# Patient Record
Sex: MALE | Race: WHITE | NOT HISPANIC OR LATINO | Employment: UNEMPLOYED | URBAN - METROPOLITAN AREA
[De-identification: names, ages, dates, MRNs, and addresses within clinical notes are randomized per-mention and may not be internally consistent; named-entity substitution may affect disease eponyms.]

---

## 2018-07-05 ENCOUNTER — OFFICE VISIT (OUTPATIENT)
Dept: INTERNAL MEDICINE CLINIC | Facility: CLINIC | Age: 28
End: 2018-07-05
Payer: COMMERCIAL

## 2018-07-05 VITALS
RESPIRATION RATE: 16 BRPM | DIASTOLIC BLOOD PRESSURE: 72 MMHG | OXYGEN SATURATION: 97 % | SYSTOLIC BLOOD PRESSURE: 124 MMHG | WEIGHT: 160 LBS | TEMPERATURE: 98.6 F | HEIGHT: 68 IN | BODY MASS INDEX: 24.25 KG/M2 | HEART RATE: 107 BPM

## 2018-07-05 DIAGNOSIS — F32.A ANXIETY AND DEPRESSION: ICD-10-CM

## 2018-07-05 DIAGNOSIS — Z72.0 NICOTINE ABUSE: ICD-10-CM

## 2018-07-05 DIAGNOSIS — Z20.5 EXPOSURE TO HEPATITIS C: ICD-10-CM

## 2018-07-05 DIAGNOSIS — F41.9 ANXIETY AND DEPRESSION: ICD-10-CM

## 2018-07-05 DIAGNOSIS — Z00.00 ROUTINE GENERAL MEDICAL EXAMINATION AT A HEALTH CARE FACILITY: ICD-10-CM

## 2018-07-05 DIAGNOSIS — Z13.6 SCREENING FOR HEART DISEASE: ICD-10-CM

## 2018-07-05 DIAGNOSIS — B07.9 WARTS OF FOOT: ICD-10-CM

## 2018-07-05 DIAGNOSIS — Z00.00 ENCOUNTER FOR PREVENTIVE CARE: ICD-10-CM

## 2018-07-05 DIAGNOSIS — R53.83 FATIGUE, UNSPECIFIED TYPE: Primary | ICD-10-CM

## 2018-07-05 DIAGNOSIS — Z13.1 SCREENING FOR DIABETES MELLITUS: ICD-10-CM

## 2018-07-05 PROCEDURE — 4004F PT TOBACCO SCREEN RCVD TLK: CPT | Performed by: NURSE PRACTITIONER

## 2018-07-05 PROCEDURE — 99204 OFFICE O/P NEW MOD 45 MIN: CPT | Performed by: NURSE PRACTITIONER

## 2018-07-05 RX ORDER — ESCITALOPRAM OXALATE 10 MG/1
5 TABLET ORAL DAILY
Qty: 30 TABLET | Refills: 0 | Status: SHIPPED | OUTPATIENT
Start: 2018-07-05

## 2018-07-05 RX ORDER — NICOTINE 21 MG/24HR
1 PATCH, TRANSDERMAL 24 HOURS TRANSDERMAL EVERY 24 HOURS
Qty: 42 PATCH | Refills: 0 | Status: SHIPPED | OUTPATIENT
Start: 2018-07-05 | End: 2018-08-16

## 2018-07-05 NOTE — ASSESSMENT & PLAN NOTE
Pt has complex mental health history and a strong family history of mental illness and substance abuse  As patient is looking to begin treatment now, we discussed treatment options and I prescribed escitalopram 10 mg and referred him to behavioral health  I explained, however, that ultimately, with his complex history he may likely require psychiatric management of his mediation as once medication is not likely to be sufficient  Pt is in agreement and he will schedule an appointment with Jose Rashid

## 2018-07-05 NOTE — ASSESSMENT & PLAN NOTE
Family history of thyroid disease  TSH level ordered  CBC also ordered to evaluate for anemia  Pt does not believe he snores

## 2018-07-05 NOTE — LETTER
July 5, 2018     Patient: Abdirahman Martinez   YOB: 1990   Date of Visit: 7/5/2018       To Whom it May Concern:    Jovanna Landon is under my professional care  He was seen in my office on 7/5/2018  He may return to work on 7/9  If you have any questions or concerns, please don't hesitate to call           Sincerely,          MIRIAM Chi        CC: Wm Huang

## 2018-07-05 NOTE — ASSESSMENT & PLAN NOTE
Prescribed nicotine patch 21 mg x 6 weeks  Explained how to use the product; he should remove patch before bed and then apply new one in the morning

## 2018-07-05 NOTE — PROGRESS NOTES
Assessment/Plan:    Warts of foot  Pt is afraid to try over the counter treatments because he does not want to make anything worse and he is worried that he will lose his foot  I did try to reassure him that the home treatments are low risk and he can try topical treatments of freezing treatments  He states he is going to try an aloe vera treatment that he read about  I did also give him a referral to podiatry because if he feels he would like a professional opinion  He would also like to discuss need for arch support with podiatrist     Anxiety and depression  Pt has complex mental health history and a strong family history of mental illness and substance abuse  As patient is looking to begin treatment now, we discussed treatment options and I prescribed escitalopram 10 mg and referred him to behavioral health  I explained, however, that ultimately, with his complex history he may likely require psychiatric management of his mediation as once medication is not likely to be sufficient  Pt is in agreement and he will schedule an appointment with Magaly Cormier  Exposure to hepatitis C  Exposure to known hep c infection of patients wife  Testing ordered  Fatigue  Family history of thyroid disease  TSH level ordered  CBC also ordered to evaluate for anemia  Pt does not believe he snores  Nicotine abuse  Prescribed nicotine patch 21 mg x 6 weeks  Explained how to use the product; he should remove patch before bed and then apply new one in the morning  Diagnoses and all orders for this visit:    Fatigue, unspecified type  -     TSH, 3rd generation with T4 reflex; Future  -     CBC and differential; Future    Screening for heart disease  -     Lipid panel; Future    Screening for diabetes mellitus  -     Comprehensive metabolic panel;  Future    Nicotine abuse  -     nicotine (NICODERM CQ) 21 mg/24 hr TD 24 hr patch; Place 1 patch on the skin every 24 hours for 42 days    Anxiety and depression  -     Ambulatory referral to [facility]; Future  -     escitalopram (LEXAPRO) 10 mg tablet; Take 0 5 tablets (5 mg total) by mouth daily    Warts of foot  -     Ambulatory referral to Podiatry; Future    Encounter for preventive care  -     Comprehensive metabolic panel; Future  -     CBC and differential; Future  -     Lipid panel; Future    Exposure to hepatitis C  -     Hepatitis C antibody; Future    Routine general medical examination at a health care facility          Subjective:      Patient ID: Mendel Forest is a 29 y o  male  Pt is a 29y o  year old male who is here today as a new pt to establish care  PMH includes bipolar disorder, PTSD, anxiety/depression, substance abuse, nicotine abuse  We reviewed age based screening recommendations, pt is in need of dental care and vision testing  He has multiple risk factors for heart disease including smoking, poor nutrition, lack of exercise so I did recommend screening for heart disease as well as diabetes  Pt also complains of fatigue and unrestful sleep and he has a family history of thyroid disease so I recommended thyroid testing as well  Pt's wife has hepatitis C history and they have been having unprotected sex for years to also recommended testing for hepatitis C  Pt is interested in smoking cessation and we discussed treatment options  He had very poor tolerance of chantix with homicidal ideation and bad dreams  He was successful with nicotine patches in the past and is interested to try this again  Pt complains of a painful lesion on his left foot x 3 weeks  It is non-draining with no erythema, numbness, tingling, or weakness  He has not tried any topical treatments  He also has an irregular wear pattern on his shoes where th inner sole wears more than the outer sole and he has associated knee pain  Pt is requesting evaluation and treatment for his mental health conditions    He has tried treatment with multiple medications as a teen but took himself off of medication several years ago  The following portions of the patient's history were reviewed and updated as appropriate: allergies, current medications, past family history, past medical history, past social history, past surgical history and problem list     Review of Systems   Constitutional: Positive for fatigue  Negative for activity change, appetite change, chills, fever and unexpected weight change  HENT: Negative for hearing loss  Eyes: Positive for visual disturbance  Respiratory: Negative for cough, chest tightness and shortness of breath  Cardiovascular: Negative for chest pain, palpitations and leg swelling  Gastrointestinal: Positive for constipation  Negative for diarrhea, nausea and vomiting  Genitourinary: Negative for dysuria and frequency  Musculoskeletal: Positive for arthralgias (r knee)  Negative for joint swelling and myalgias  Skin: Positive for wound  Neurological: Negative for dizziness, weakness, numbness and headaches  Psychiatric/Behavioral: Positive for dysphoric mood  Negative for behavioral problems, self-injury, sleep disturbance and suicidal ideas  The patient is nervous/anxious  Objective:      /72 (BP Location: Right arm, Patient Position: Sitting, Cuff Size: Standard)   Pulse (!) 107   Temp 98 6 °F (37 °C)   Resp 16   Ht 5' 8" (1 727 m)   Wt 72 6 kg (160 lb)   SpO2 97%   BMI 24 33 kg/m²          Physical Exam   Constitutional: He is oriented to person, place, and time  He appears well-developed and well-nourished  He is cooperative  HENT:   Right Ear: Hearing, tympanic membrane, external ear and ear canal normal    Left Ear: Hearing, tympanic membrane, external ear and ear canal normal    Nose: Mucosal edema present  Mouth/Throat: Oropharynx is clear and moist  Mucous membranes are dry  Eyes: Conjunctivae and lids are normal  Pupils are equal, round, and reactive to light  Neck: Normal range of motion and full passive range of motion without pain  Neck supple  No JVD present  Carotid bruit is not present  No thyromegaly present  Cardiovascular: Normal rate, regular rhythm, S1 normal, S2 normal, normal heart sounds and intact distal pulses  No murmur heard  Pulmonary/Chest: Effort normal and breath sounds normal    Abdominal: Soft  Normal appearance and bowel sounds are normal  There is no hepatosplenomegaly  There is tenderness in the right upper quadrant  Musculoskeletal: Normal range of motion  He exhibits no edema  Lymphadenopathy:     He has no cervical adenopathy  Neurological: He is alert and oriented to person, place, and time  He has normal strength and normal reflexes  No sensory deficit  Skin: Skin is warm, dry and intact  Psychiatric: He has a normal mood and affect  His speech is normal and behavior is normal  Judgment and thought content normal  Cognition and memory are normal    Vitals reviewed

## 2018-07-05 NOTE — ASSESSMENT & PLAN NOTE
Pt is afraid to try over the counter treatments because he does not want to make anything worse and he is worried that he will lose his foot  I did try to reassure him that the home treatments are low risk and he can try topical treatments of freezing treatments  He states he is going to try an aloe vera treatment that he read about  I did also give him a referral to podiatry because if he feels he would like a professional opinion    He would also like to discuss need for arch support with podiatrist

## 2018-07-27 ENCOUNTER — TELEPHONE (OUTPATIENT)
Dept: INTERNAL MEDICINE CLINIC | Facility: CLINIC | Age: 28
End: 2018-07-27

## 2018-07-27 NOTE — TELEPHONE ENCOUNTER
Pt wife asked for a call back regarding Pt lexapro  She has not seen any change regarding his depression  She can be reached at [phone]  He is working 2 separate shifts today so he is unable to schedule an appt, unless it's a walk-in

## 2018-08-30 ENCOUNTER — OFFICE VISIT (OUTPATIENT)
Dept: BEHAVIORAL/MENTAL HEALTH CLINIC | Facility: CLINIC | Age: 28
End: 2018-08-30
Payer: COMMERCIAL

## 2018-08-30 DIAGNOSIS — F32.A ANXIETY AND DEPRESSION: Primary | ICD-10-CM

## 2018-08-30 DIAGNOSIS — F41.9 ANXIETY AND DEPRESSION: Primary | ICD-10-CM

## 2018-08-30 PROCEDURE — 90834 PSYTX W PT 45 MINUTES: CPT | Performed by: SOCIAL WORKER
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
## 2018-08-31 ENCOUNTER — TELEPHONE (OUTPATIENT)
Dept: PSYCHIATRY | Facility: CLINIC | Age: 28
End: 2018-08-31

## 2018-08-31 NOTE — TELEPHONE ENCOUNTER
----- Message from Natanael Ramos sent at 8/30/2018  5:39 PM EDT -----  Would like him to see Ankit Alvarez

## 2018-09-06 ENCOUNTER — TELEPHONE (OUTPATIENT)
Dept: BEHAVIORAL/MENTAL HEALTH CLINIC | Facility: CLINIC | Age: 28
End: 2018-09-06
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
## 2018-09-27 ENCOUNTER — OFFICE VISIT (OUTPATIENT)
Dept: BEHAVIORAL/MENTAL HEALTH CLINIC | Facility: CLINIC | Age: 28
End: 2018-09-27
Payer: COMMERCIAL

## 2018-09-27 DIAGNOSIS — F32.A ANXIETY AND DEPRESSION: Primary | ICD-10-CM

## 2018-09-27 DIAGNOSIS — F41.9 ANXIETY AND DEPRESSION: Primary | ICD-10-CM

## 2018-09-27 PROCEDURE — 90834 PSYTX W PT 45 MINUTES: CPT | Performed by: SOCIAL WORKER

## 2018-09-27 NOTE — PATIENT INSTRUCTIONS
Processed his feelings of sadness regarding mother's passing as well his anger and frustration from family's behavior  Validated these feelings and support provided  Reviewed appropriate coping strategies for anxiety and depression on consistent basis  Focused on utilizing appropriate outlets and expression of emotions

## 2018-09-27 NOTE — PSYCH
Assessment/Plan: Manage anxiety and depression     There are no diagnoses linked to this encounter  Subjective: Has been struggling with anxiety and anger issues due to stressors  Stressors are valid  Patient ID: Brayan Joy is a 29 y o  male  Continues to struggle with anxiety and depression  Working over 60 hours a week  Review of Systems   Psychiatric/Behavioral: Positive for dysphoric mood  The patient is nervous/anxious  Objective: Presents as verbal, cooperative and well oriented  Tearful discussing loss of mother and his housing and financial issues family has made worse for him  Denies any SI or HI  Has reduced his alcohol intake after beginning to drink more in response to stress  Physical Exam   Psychiatric: His speech is normal and behavior is normal  Judgment and thought content normal  His mood appears anxious  Cognition and memory are normal  He exhibits a depressed mood  No SI or HI

## 2018-11-01 ENCOUNTER — OFFICE VISIT (OUTPATIENT)
Dept: BEHAVIORAL/MENTAL HEALTH CLINIC | Facility: CLINIC | Age: 28
End: 2018-11-01
Payer: COMMERCIAL

## 2018-11-01 DIAGNOSIS — F41.9 ANXIETY: ICD-10-CM

## 2018-11-01 DIAGNOSIS — F43.12 CHRONIC POST-TRAUMATIC STRESS DISORDER (PTSD): Primary | ICD-10-CM

## 2018-11-01 DIAGNOSIS — F33.1 DEPRESSION, MAJOR, RECURRENT, MODERATE (HCC): ICD-10-CM

## 2018-11-01 PROCEDURE — 90791 PSYCH DIAGNOSTIC EVALUATION: CPT | Performed by: SOCIAL WORKER
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
## 2018-11-01 NOTE — PSYCH
Carmen Ward Michelle  1990       Date of Initial Treatment Plan: 11/1/18   Date of Current Treatment Plan: 11/01/18       Treatment Plan Number 1     Strengths/Personal Resources for Self Care: Father, hard worker,     Diagnosis:   1  Chronic post-traumatic stress disorder (PTSD)     2  Anxiety     3  Depression, major, recurrent, moderate (HCC)         Area of Needs: Historic trauma, financial struggles, recent death of mother      Long Term Goal 1:   I have gotten over my demons from my past  Target Date: 2/25/19  Completion Date:     Short Term Objectives for Goal 1:   1  Process historic trauma  2  Identify/utilize skills  3  Currently living in house I grew up in  4  Mom's death     Long Term Goal 2:    Jacquie and I communicate more effectively  Target Date: 2/25/19  Completion Date:     Short Term Objectives for Goal 2:   1  Stopping to think before I comment when angry or hurt       2  Trust       3  Financial issues      GOAL 1: Modality: Individual therapy 2x/month   Completion Date:                  Persons responsible for goals:  Flash Weller    GOAL 2: Modality:  Individual therapy 2x/month   Completion Date:                  Persons responsible for goals:  Flash Villarreal [facility] 72 : Diagnosis and Treatment Plan explained to Horacioharmony Libertyshala relates understanding diagnosis and is agreeable to Treatment Plan         Client Comments : Please share your thoughts, feelings, need and/or experiences regarding your treatment plan:       __________________________________________________________________    __________________________________________________________________    __________________________________________________________________    __________________________________________________________________    _______________________________________                Patient signature, Date Time: __________________________________________             Physician cosigner signature, Date, Time: ________________________________

## 2018-11-05 ENCOUNTER — TELEPHONE (OUTPATIENT)
Dept: INTERNAL MEDICINE CLINIC | Facility: CLINIC | Age: 28
End: 2018-11-05

## 2018-11-05 NOTE — TELEPHONE ENCOUNTER
----- Message from Diamond Scott sent at 11/2/2018  8:22 AM EDT -----  Can somebody please schedule this patient with me so that I can discuss discuss his medication changes  Thanks :)  ----- Message -----  From: Domenica Martinez  Sent: 11/1/2018  11:01 AM  To: MIRIAM Sánchez, Frank Reyna and Lynn Speaker in the office today  Very nice man but struggling greatly with PTSD symptoms and depression, anxiety  His PHQ is high - no SI though  GAD7 also high  He disclosed he has been taking his friends Xanax 1mg and this has been helpful as without it he can't stop his thoughts/memories or sleep  Can we give him is own script? He also stated Lexapro is not effective  He tried doubling his dose and was on it faithfully for a decent time period without effectiveness  We are not scheduling Psychiatry due to back log Es Every you know this)  Can we try Zoloft or Prozac? Lincoln Franklin can you get him in for an appointment to see Fabian Coyle? Thanks!  Fabian Coyle

## 2018-11-05 NOTE — TELEPHONE ENCOUNTER
Called patient to schedule appointment no answer left message asking to give the office a call back

## 2019-01-15 ENCOUNTER — DOCUMENTATION (OUTPATIENT)
Dept: BEHAVIORAL/MENTAL HEALTH CLINIC | Facility: CLINIC | Age: 29
End: 2019-01-15

## 2019-01-15 DIAGNOSIS — F43.12 CHRONIC POST-TRAUMATIC STRESS DISORDER (PTSD): Primary | ICD-10-CM

## 2019-01-15 DIAGNOSIS — F33.1 DEPRESSION, MAJOR, RECURRENT, MODERATE (HCC): ICD-10-CM

## 2019-01-15 DIAGNOSIS — F41.9 ANXIETY: ICD-10-CM

## 2019-01-15 NOTE — PROGRESS NOTES
Assessment/Plan:      Diagnoses and all orders for this visit:    Chronic post-traumatic stress disorder (PTSD)    Depression, major, recurrent, moderate (HCC)    Anxiety          Subjective:     Patient ID: Bronson Brooks is a 29 y o  male  Outpatient Discharge Summary:   Admission Date: 11/1/18  Maldonado Ta was referred by [facility]  Discharge Date: 1/17/19    Discharge Diagnosis:    1  Chronic post-traumatic stress disorder (PTSD)     2  Depression, major, recurrent, moderate ([address] )     3  Anxiety         Treating Physician: MIIRAM Guevara  Treatment Complications: None  Presenting Problem: Mom passed away end of July this year  We didn't have a good history - but we just started rebuilding relationship and she passed  Death was a shock   4 years- together 8  Jacquie has been very supportive  Have 2 children, Judit 4, Benito 3  New baby on the way (boy) due 11/30  Financial issues are a struggle  Since mom's passing - historic childhood trauma has resurfaced  Legalized at  - went to [facility] as an adult - conspiricry to commit robbert - lived at a half [employer]  "[employer] [facility] the [facility]'  Maldonado Ta did not attend any additional sessions after initial eval     Course of treatment includes:    individual therapy   Treatment Progress: Unable to assess due to only one appointment  Criteria for Discharge: Maldonado Ta did not attend any additional sessions after initial eval   Aftercare recommendations include Encouraged to follow Dr Hardy Kong and return to therapy when able to commit to scheduled appointments    Discharge Medications include:  Current Outpatient Prescriptions:     escitalopram (LEXAPRO) 10 mg tablet, Take 0 5 tablets (5 mg total) by mouth daily, Disp: 30 tablet, Rfl: 0    nicotine (NICODERM CQ) 21 mg/24 hr TD 24 hr patch, Place 1 patch on the skin every 24 hours for 42 days, Disp: 42 patch, Rfl: 0    Prognosis: Unable to assess due to only one appointment

## 2022-03-13 ENCOUNTER — HOSPITAL ENCOUNTER (EMERGENCY)
Facility: HOSPITAL | Age: 32
Discharge: HOME | End: 2022-03-13
Attending: EMERGENCY MEDICINE

## 2022-03-13 VITALS
OXYGEN SATURATION: 99 % | WEIGHT: 160 LBS | BODY MASS INDEX: 24.33 KG/M2 | HEART RATE: 121 BPM | TEMPERATURE: 98.9 F | SYSTOLIC BLOOD PRESSURE: 135 MMHG | RESPIRATION RATE: 18 BRPM | DIASTOLIC BLOOD PRESSURE: 88 MMHG

## 2022-03-13 DIAGNOSIS — S61.512A WRIST LACERATION, LEFT, INITIAL ENCOUNTER: Primary | ICD-10-CM

## 2022-03-13 PROCEDURE — 99283 EMERGENCY DEPT VISIT LOW MDM: CPT

## 2022-03-13 PROCEDURE — 12002 RPR S/N/AX/GEN/TRNK2.6-7.5CM: CPT | Performed by: PHYSICIAN ASSISTANT

## 2022-03-13 PROCEDURE — 99282 EMERGENCY DEPT VISIT SF MDM: CPT | Performed by: PHYSICIAN ASSISTANT

## 2022-03-13 RX ORDER — LIDOCAINE HYDROCHLORIDE 20 MG/ML
10 INJECTION, SOLUTION EPIDURAL; INFILTRATION; INTRACAUDAL; PERINEURAL ONCE
Status: COMPLETED | OUTPATIENT
Start: 2022-03-13 | End: 2022-03-13

## 2022-03-13 RX ADMIN — LIDOCAINE HYDROCHLORIDE 10 ML: 20 INJECTION, SOLUTION EPIDURAL; INFILTRATION; INTRACAUDAL; PERINEURAL at 05:30

## 2022-03-13 NOTE — ED PROVIDER NOTES
History  Chief Complaint   Patient presents with    Hand Laceration     Hand laceration and then after needs medical clearance for incarceration  80-year-old right-handed male without significant past medical history presents in police custody for evaluation of a left wrist laceration prior to incarceration  Patient will not tell me how he cut his wrist   Patient states that his tetanus shot is up-to-date and was then in the last 5 years  Denies any other injury sustained  Denies SI or HI  Denies any other complaints       History provided by:  Patient   used: No        Prior to Admission Medications   Prescriptions Last Dose Informant Patient Reported? Taking?   escitalopram (LEXAPRO) 10 mg tablet   No No   Sig: Take 0 5 tablets (5 mg total) by mouth daily   nicotine (NICODERM CQ) 21 mg/24 hr TD 24 hr patch   No No   Sig: Place 1 patch on the skin every 24 hours for 42 days      Facility-Administered Medications: None       Past Medical History:   Diagnosis Date    Anxiety     Dental disorder 04/17/2017    Last assessed    Depression     GERD (gastroesophageal reflux disease)     intermittent    Substance abuse ([facility] [address] )     Visual impairment     distance vision blurring       Past Surgical History:   Procedure Laterality Date    DENTAL SURGERY         Family History   Problem Relation Age of Onset    Diabetes Mother     Thyroid disease Mother     Hypertension Mother     COPD Mother     Asthma Mother     Arthritis Mother     Depression Mother     Mental illness Mother     Substance Abuse Mother     Diabetes Maternal Aunt     Breast cancer Maternal Aunt     Hypertension Maternal Uncle     Diabetes Family     Thyroid disease Sister     Diabetes Maternal Grandmother     Coronary artery disease Maternal Grandmother     Arthritis Maternal Grandmother      I have reviewed and agree with the history as documented      E-Cigarette/Vaping     E-Cigarette/Vaping Substances Social History     Tobacco Use    Smoking status: Current Every Day Smoker     Packs/day: 1 00     Years: 15 00     Pack years: 15 00    Smokeless tobacco: Never Used   Substance Use Topics    Alcohol use: Yes     Alcohol/week: 21 0 standard drinks     Types: 21 Cans of beer per week    Drug use: Yes     Types: Marijuana, Oxycodone     Comment: THC oil daily, occasional percocet for knee pain       Review of Systems   Constitutional: Negative  Negative for chills and fatigue  HENT: Negative for ear pain and sore throat  Eyes: Negative for photophobia and redness  Respiratory: Negative for apnea, cough and shortness of breath  Cardiovascular: Negative for chest pain  Gastrointestinal: Negative for abdominal pain, nausea and vomiting  Genitourinary: Negative for dysuria  Musculoskeletal: Negative for arthralgias, neck pain and neck stiffness  Skin: Positive for wound  Negative for rash  Neurological: Negative for dizziness, tremors, syncope and weakness  Psychiatric/Behavioral: Negative for suicidal ideas  Physical Exam  Physical Exam  Constitutional:       General: He is not in acute distress  Appearance: He is well-developed  He is not diaphoretic  Eyes:      Pupils: Pupils are equal, round, and reactive to light  Cardiovascular:      Rate and Rhythm: Normal rate and regular rhythm  Pulmonary:      Effort: Pulmonary effort is normal  No respiratory distress  Breath sounds: Normal breath sounds  Abdominal:      General: Bowel sounds are normal  There is no distension  Palpations: Abdomen is soft  Musculoskeletal:         General: Normal range of motion  Cervical back: Normal range of motion and neck supple  Skin:     General: Skin is warm and dry  Comments: 3 cm vertical linear laceration to the ulnar mercer aspect of the left wrist with minor bleeding  No pulsatile bleeding  Range of motion and sensation intact distally     Neurological: Mental Status: He is alert and oriented to person, place, and time  Vital Signs  ED Triage Vitals [03/13/22 0441]   Temperature Pulse Respirations Blood Pressure SpO2   98 9 °F (37 2 °C) (!) 121 18 135/88 99 %      Temp Source Heart Rate Source Patient Position - Orthostatic VS BP Location FiO2 (%)   Tympanic Monitor Sitting Right arm --      Pain Score       No Pain           Vitals:    03/13/22 0441   BP: 135/88   Pulse: (!) 121   Patient Position - Orthostatic VS: Sitting         Visual Acuity      ED Medications  Medications   lidocaine (PF) (XYLOCAINE-MPF) 2 % injection 10 mL (has no administration in time range)       Diagnostic Studies  Results Reviewed     None                 No orders to display              Procedures  Laceration repair    Date/Time: 3/13/2022 5:26 AM  Performed by: Nisha Bernal PA-C  Authorized by: Nisha Bernal PA-C   Consent: Verbal consent obtained  Risks and benefits: risks, benefits and alternatives were discussed  Consent given by: patient  Patient understanding: patient states understanding of the procedure being performed  Patient consent: the patient's understanding of the procedure matches consent given  Procedure consent: procedure consent matches procedure scheduled  Relevant documents: relevant documents present and verified  Test results: test results available and properly labeled  Site marked: the operative site was marked  Radiology Images displayed and confirmed  If images not available, report reviewed: imaging studies available  Required items: required blood products, implants, devices, and special equipment available  Time out: Immediately prior to procedure a "time out" was called to verify the correct patient, procedure, equipment, support staff and site/side marked as required    Body area: upper extremity  Location details: left wrist  Laceration length: 3 cm  Tendon involvement: none  Nerve involvement: none  Vascular damage: yes  Anesthesia: local infiltration    Anesthesia:  Local Anesthetic: lidocaine 2% without epinephrine  Anesthetic total: 5 mL    Wound Dehiscence:  Superficial Wound Dehiscence: simple closure      Procedure Details:  Preparation: Patient was prepped and draped in the usual sterile fashion  Irrigation solution: saline  Amount of cleaning: standard  Debridement: none  Degree of undermining: none  Skin closure: 5-0 nylon  Number of sutures: 4  Technique: buried suture  Approximation: close  Approximation difficulty: simple  Dressing: 4x4 sterile gauze  Patient tolerance: patient tolerated the procedure well with no immediate complications               ED Course                                             MDM  Number of Diagnoses or Management Options  Wrist laceration, left, initial encounter: new and does not require workup  Diagnosis management comments: Laceration closed by me and procedure documented well tolerated no complications good approximation achieved  Bleeding controlled  Patient was cleared for discharge with police  Risk of Complications, Morbidity, and/or Mortality  Presenting problems: moderate  Diagnostic procedures: moderate  Management options: moderate    Patient Progress  Patient progress: stable      Disposition  Final diagnoses:   Wrist laceration, left, initial encounter     Time reflects when diagnosis was documented in both MDM as applicable and the Disposition within this note     Time User Action Codes Description Comment    3/13/2022  4:58 AM Danita Yi Add [[id number]] Wrist laceration, left, initial encounter       ED Disposition     ED Disposition Condition Date/Time Comment    Discharge Stable Sun Mar 13, 2022  5:25 AM Emma Martinez discharge to home/self care              Follow-up Information     Follow up With Specialties Details Why [address] [facility], [address] [facility] Internal Medicine, Nurse Practitioner Call  As needed [address] [address] [place] [address]  [phone]            Patient's Medications   Discharge Prescriptions    No medications on file       No discharge procedures on file      PDMP Review     None          ED Provider  Electronically Signed by           Luis Angel Ash PA-C  03/13/22 9680

## 2022-03-13 NOTE — DISCHARGE INSTRUCTIONS
Keep area clean and covered  Return in 7-10 days for suture removal   Return sooner for any signs of infection  May use Tylenol for pain      Patient medically cleared for incarceration

## 2024-02-21 PROBLEM — Z00.00 ROUTINE GENERAL MEDICAL EXAMINATION AT A HEALTH CARE FACILITY: Status: RESOLVED | Noted: 2018-07-05 | Resolved: 2024-02-21

## 2024-04-02 ENCOUNTER — TELEPHONE (OUTPATIENT)
Dept: FAMILY MEDICINE CLINIC | Facility: CLINIC | Age: 34
End: 2024-04-02

## 2024-04-02 NOTE — TELEPHONE ENCOUNTER
LM for pt to call back and let us know if he wants to est at [facility] or have margarita removed as pcp